# Patient Record
Sex: MALE | Race: OTHER | NOT HISPANIC OR LATINO | Employment: OTHER | URBAN - METROPOLITAN AREA
[De-identification: names, ages, dates, MRNs, and addresses within clinical notes are randomized per-mention and may not be internally consistent; named-entity substitution may affect disease eponyms.]

---

## 2017-02-03 ENCOUNTER — OFFICE VISIT (OUTPATIENT)
Dept: MEDICAL GROUP | Facility: PHYSICIAN GROUP | Age: 77
End: 2017-02-03
Payer: MEDICARE

## 2017-02-03 VITALS
TEMPERATURE: 97.9 F | HEART RATE: 82 BPM | HEIGHT: 75 IN | OXYGEN SATURATION: 99 % | RESPIRATION RATE: 14 BRPM | DIASTOLIC BLOOD PRESSURE: 86 MMHG | BODY MASS INDEX: 22.68 KG/M2 | WEIGHT: 182.4 LBS | SYSTOLIC BLOOD PRESSURE: 162 MMHG

## 2017-02-03 PROCEDURE — 4040F PNEUMOC VAC/ADMIN/RCVD: CPT | Mod: 8P | Performed by: NURSE PRACTITIONER

## 2017-02-03 PROCEDURE — G8432 DEP SCR NOT DOC, RNG: HCPCS | Performed by: NURSE PRACTITIONER

## 2017-02-03 PROCEDURE — G8484 FLU IMMUNIZE NO ADMIN: HCPCS | Performed by: NURSE PRACTITIONER

## 2017-02-03 PROCEDURE — 1036F TOBACCO NON-USER: CPT | Performed by: NURSE PRACTITIONER

## 2017-02-03 PROCEDURE — G8420 CALC BMI NORM PARAMETERS: HCPCS | Performed by: NURSE PRACTITIONER

## 2017-02-03 PROCEDURE — 1101F PT FALLS ASSESS-DOCD LE1/YR: CPT | Performed by: NURSE PRACTITIONER

## 2017-02-03 PROCEDURE — 99214 OFFICE O/P EST MOD 30 MIN: CPT | Performed by: NURSE PRACTITIONER

## 2017-02-03 RX ORDER — LISINOPRIL 20 MG/1
TABLET ORAL
Qty: 90 TAB | Refills: 0 | Status: SHIPPED | OUTPATIENT
Start: 2017-02-03 | End: 2017-03-10 | Stop reason: SDUPTHER

## 2017-02-03 RX ORDER — LISINOPRIL 20 MG/1
20 TABLET ORAL DAILY
Qty: 30 TAB | Refills: 2 | Status: SHIPPED | OUTPATIENT
Start: 2017-02-03 | End: 2017-02-03 | Stop reason: SDUPTHER

## 2017-02-03 NOTE — PROGRESS NOTES
Chief Complaint   Patient presents with   • Hypertension       Subjective:   Mariano Mina is a 76 y.o. Male patient of Dr. Kramer also if the emesis on a letter is to return if fever or sore throat and no other symptoms    Thank you very much here today for evaluation and management of:    Blood pressure elevated  Patient reports that over the last couple of weeks he has been taking his blood pressure on a daily basis and noticed that his blood pressure readings are greater than 150 systolic and over 100 diastolic. Denies chest pain shortness of breath palpitations. Has had some episodes of headaches occasionally sharp headaches. Patient is currently taking a statin for hyperlipidemia and taking at daily low-dose aspirin and Flomax for his BPH. He states that he follows a diabetic diet since his wife is a type I diabetic. States that his exercise level has fallen off in the last 3-4 months.     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  Current medicines (including changes today)  Current Outpatient Prescriptions   Medication Sig Dispense Refill   • lisinopril (PRINIVIL) 20 MG Tab Take 1 Tab by mouth every day. 30 Tab 2   • Omega-3 Fatty Acids (FISH OIL) 1000 MG Cap capsule Take 1,000 mg by mouth 3 times a day, with meals.     • tamsulosin (FLOMAX) 0.4 MG capsule TAKE ONE CAPSULE BY MOUTH AT BEDTIME 90 Cap 3   • amitriptyline (ELAVIL) 25 MG Tab Take 1 Tab by mouth at bedtime as needed for Sleep. 90 Tab 3   • simvastatin (ZOCOR) 40 MG Tab TAKE 1 TABLET BY MOUTH EVERY EVENING 90 Tab 3   • b complex vitamins tablet Take 1 Tab by mouth every day. 30 Tab 3   • Cholecalciferol (VITAMIN D3) 1000 UNIT TABS Take 2 Tabs by mouth every day. 100 Tab 3   • aspirin 81 MG tablet Take 81 mg by mouth every day.       No current facility-administered medications for this visit.     He  has a past medical history of Hyperlipidemia.    ROS as stated  "in HPI + elevated blood pressure, some headaches, - chest pain, palpitations, visual disturbances, edema  No chest pain, no shortness of breath, no abdominal pain       Objective:     Blood pressure 162/86, pulse 82, temperature 36.6 °C (97.9 °F), resp. rate 14, height 1.892 m (6' 2.5\"), weight 82.736 kg (182 lb 6.4 oz), SpO2 99 %. Body mass index is 23.11 kg/(m^2).   Physical Exam:  Constitutional: Alert, no distress.  Skin: Warm, dry, good turgor,no cyanosis, no rashes in visible areas.  Eye: Equal, round and reactive, conjunctiva clear, lids normal.  Ears: No tenderness, no discharge.  External canals are without any significant edema or erythema. Gross auditory acuity is intact.  Nose: symmetrical without tenderness, no discharge.  Mouth/Throat: lips without lesion.  Oropharynx clear.  .  Neck: Trachea midline, no masses, no obvious thyroid enlargement.. . Range of motion within normal limits.  Neuro: Cranial nerves 2-12 grossly intact.  No sensory deficit.  Respiratory: Unlabored respiratory effort, lungs clear to auscultation, no wheezes, no ronchi.  Cardiovascular: Normal S1, S2, no murmur, no edema. No carotid bruit appreciated..  Psych: Alert and oriented x3, normal affect and mood and judgement.  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        Assessment and Plan:   The following treatment plan was discussed    1. Blood pressure elevated  This is a new problem to me. Acute. Unstable. Will start patient on lisinopril 20 mg by mouth daily. Patient to continue to monitor his blood pressure morning and evening. Patient understands that if he were to experience chest pain shortness of breath visual disturbances or strokelike symptoms that this would be a 911 call. Patient will follow up in clinic in one week for blood pressure review and check. Monitor and follow.      Followup: Return in about 1 week (around 2/10/2017) for HTN.           "

## 2017-02-03 NOTE — MR AVS SNAPSHOT
"        Mariano Mina   2/3/2017 9:40 AM   Office Visit   MRN: [id number]    Department:  [facility]   Dept Phone:  [phone]    Description:  Male : 1940   Provider:  LUCY Syed           Reason for Visit     Hypertension           Allergies as of 2/3/2017     Allergen Noted Reactions    Terbinafine 2014       Loss of taste    Trazodone 2016       Excess sedation      You were diagnosed with     Blood pressure elevated   [940930]         Vital Signs     Blood Pressure Pulse Temperature Respirations Height Weight    162/86 mmHg 82 36.6 °C (97.9 °F) 14 1.892 m (6' 2.5\") 82.736 kg (182 lb 6.4 oz)    Body Mass Index Oxygen Saturation Smoking Status             23.11 kg/m2 99% Former Smoker         Basic Information     Date Of Birth Sex Race Ethnicity Preferred Language    1940 Male Other Non- English      Your appointments     Feb 10, 2017  9:40 AM   Established Patient with LUCY Syed   [facility] ([address] [id number] [phone]           You will be receiving a confirmation call a few days before your appointment from our automated call confirmation system.              Problem List              ICD-10-CM Priority Class Noted - Resolved    Mixed hyperlipidemia E78.2   2011 - Present    BPH (benign prostatic hyperplasia) N40.0   2011 - Present    Insomnia G47.00   2011 - Present    Macrocytosis without anemia D75.89   2014 - Present    Vitamin D deficiency E55.9   2014 - Present    Blood pressure elevated I10   2/3/2017 - Present      Health Maintenance        Date Due Completion Dates    IMM ZOSTER VACCINE 1999 ---    IMM PNEUMOCOCCAL 65+ (ADULT) LOW/MEDIUM RISK SERIES (1 of 2 - PCV13) 2004 ---    IMM INFLUENZA (1) 2016 11/3/2011    COLON CANCER SCREENING ANNUAL FIT 2019 (Originally 2015) 2014    IMM DTaP/Tdap/Td Vaccine (2 - Td) " 11/20/2021 11/20/2011            Current Immunizations     Influenza TIV (IM) 11/3/2011    MMR Vaccine 11/20/2011    Tdap Vaccine 11/20/2011      Below and/or attached are the medications your provider expects you to take. Review all of your home medications and newly ordered medications with your provider and/or pharmacist. Follow medication instructions as directed by your provider and/or pharmacist. Please keep your medication list with you and share with your provider. Update the information when medications are discontinued, doses are changed, or new medications (including over-the-counter products) are added; and carry medication information at all times in the event of emergency situations     Allergies:  TERBINAFINE - (reactions not documented)     TRAZODONE - (reactions not documented)               Medications  Valid as of: February 03, 2017 - 10:56 AM    Generic Name Brand Name Tablet Size Instructions for use    Amitriptyline HCl (Tab) ELAVIL 25 MG Take 1 Tab by mouth at bedtime as needed for Sleep.        Aspirin (Tab) aspirin 81 MG Take 81 mg by mouth every day.        B Complex Vitamins (Tab) b complex vitamins  Take 1 Tab by mouth every day.        Cholecalciferol (Tab) vitamin D3 (cholecalciferol) 1000 UNIT Take 2 Tabs by mouth every day.        Lisinopril (Tab) PRINIVIL 20 MG Take 1 Tab by mouth every day.        Omega-3 Fatty Acids (Cap) fish oil 1000 MG Take 1,000 mg by mouth 3 times a day, with meals.        Simvastatin (Tab) ZOCOR 40 MG TAKE 1 TABLET BY MOUTH EVERY EVENING        Tamsulosin HCl (Cap) FLOMAX 0.4 MG TAKE ONE CAPSULE BY MOUTH AT BEDTIME        .                 Medicines prescribed today were sent to:     [id number] - [place], [place] - [address] [facility] [address]    [address] [place] [address]    Phone: [phone] Fax: [phone]    Open 24 Hours?: No      Medication refill instructions:       If your prescription bottle indicates you have  medication refills left, it is not necessary to call your provider’s office. Please contact your pharmacy and they will refill your medication.    If your prescription bottle indicates you do not have any refills left, you may request refills at any time through one of the following ways: The online Klocwork system (except Urgent Care), by calling your provider’s office, or by asking your pharmacy to contact your provider’s office with a refill request. Medication refills are processed only during regular business hours and may not be available until the next business day. Your provider may request additional information or to have a follow-up visit with you prior to refilling your medication.   *Please Note: Medication refills are assigned a new Rx number when refilled electronically. Your pharmacy may indicate that no refills were authorized even though a new prescription for the same medication is available at the pharmacy. Please request the medicine by name with the pharmacy before contacting your provider for a refill.           Klocwork Access Code: [email or web address]  Expires: 3/5/2017 10:56 AM    Klocwork  A secure, online tool to manage your health information     [employer]’s Klocwork® is a secure, online tool that connects you to your personalized health information from the privacy of your home -- day or night - making it very easy for you to manage your healthcare. Once the activation process is completed, you can even access your medical information using the Klocwork noemí, which is available for free in the Apple Noemí store or Google Play store.     Klocwork provides the following levels of access (as shown below):   My Chart Features   [facility] Doctor [facility]  Specialists [facility]  Urgent  Care Non-[facility]  Primary Care  Doctor   Email your healthcare team securely and privately 24/7 X X X    Manage appointments: schedule your next appointment; view details of past/upcoming appointments X       Request prescription refills. X      View recent personal medical records, including lab and immunizations X X X X   View health record, including health history, allergies, medications X X X X   Read reports about your outpatient visits, procedures, consult and ER notes X X X X   See your discharge summary, which is a recap of your hospital and/or ER visit that includes your diagnosis, lab results, and care plan. X X       How to register for Hudl:  1. Go to  [email or web address].  2. Click on the Sign Up Now box, which takes you to the New Member Sign Up page. You will need to provide the following information:  a. Enter your Hudl Access Code exactly as it appears at the top of this page. (You will not need to use this code after you’ve completed the sign-up process. If you do not sign up before the expiration date, you must request a new code.)   b. Enter your date of birth.   c. Enter your home email address.   d. Click Submit, and follow the next screen’s instructions.  3. Create a Hudl ID. This will be your Hudl login ID and cannot be changed, so think of one that is secure and easy to remember.  4. Create a Hudl password. You can change your password at any time.  5. Enter your Password Reset Question and Answer. This can be used at a later time if you forget your password.   6. Enter your e-mail address. This allows you to receive e-mail notifications when new information is available in Hudl.  7. Click Sign Up. You can now view your health information.    For assistance activating your Hudl account, call [phone]

## 2017-02-03 NOTE — ASSESSMENT & PLAN NOTE
Patient reports that over the last couple of weeks he has been taking his blood pressure on a daily basis and noticed that his blood pressure readings are greater than 150 systolic and over 100 diastolic. Denies chest pain shortness of breath palpitations. Has had some episodes of headaches occasionally sharp headaches. Patient is currently taking a statin for hyperlipidemia and taking at daily low-dose aspirin and Flomax for his BPH. He states that he follows a diabetic diet since his wife is a type I diabetic. States that his exercise level has fallen off in the last 3-4 months.

## 2017-02-03 NOTE — TELEPHONE ENCOUNTER
Requested Prescriptions     Signed Prescriptions Disp Refills   • lisinopril (PRINIVIL) 20 MG Tab 90 Tab 0     Sig: TAKE 1 TABLET BY MOUTH EVERY DAY     Authorizing Provider: SANDER CESAR A.P.R.N.

## 2017-02-10 ENCOUNTER — OFFICE VISIT (OUTPATIENT)
Dept: MEDICAL GROUP | Facility: PHYSICIAN GROUP | Age: 77
End: 2017-02-10
Payer: MEDICARE

## 2017-02-10 VITALS
TEMPERATURE: 97.7 F | RESPIRATION RATE: 14 BRPM | BODY MASS INDEX: 23.59 KG/M2 | OXYGEN SATURATION: 98 % | HEART RATE: 94 BPM | WEIGHT: 183.8 LBS | DIASTOLIC BLOOD PRESSURE: 72 MMHG | HEIGHT: 74 IN | SYSTOLIC BLOOD PRESSURE: 122 MMHG

## 2017-02-10 PROCEDURE — 4040F PNEUMOC VAC/ADMIN/RCVD: CPT | Mod: 8P | Performed by: NURSE PRACTITIONER

## 2017-02-10 PROCEDURE — G8432 DEP SCR NOT DOC, RNG: HCPCS | Performed by: NURSE PRACTITIONER

## 2017-02-10 PROCEDURE — 1036F TOBACCO NON-USER: CPT | Performed by: NURSE PRACTITIONER

## 2017-02-10 PROCEDURE — 1101F PT FALLS ASSESS-DOCD LE1/YR: CPT | Performed by: NURSE PRACTITIONER

## 2017-02-10 PROCEDURE — G8484 FLU IMMUNIZE NO ADMIN: HCPCS | Performed by: NURSE PRACTITIONER

## 2017-02-10 PROCEDURE — 99213 OFFICE O/P EST LOW 20 MIN: CPT | Performed by: NURSE PRACTITIONER

## 2017-02-10 PROCEDURE — G8420 CALC BMI NORM PARAMETERS: HCPCS | Performed by: NURSE PRACTITIONER

## 2017-02-10 NOTE — ASSESSMENT & PLAN NOTE
Patient retruns after one week on Lisinopril 20 mg.  BP at goal in clinic at 122/72.  Denies dizziness, chest pain, palpitations.  States that he is feeling better and is ready to re-start his exercise plan.  Daily stressors with caring for wife who is diabetic and having memory loss.

## 2017-02-10 NOTE — PROGRESS NOTES
"Chief Complaint   Patient presents with   • Follow-Up     blood pressure       Subjective:   Mariano Mina is a 76 y.o. male here today for evaluation and management of:    Blood pressure elevated  Patient retruns after one week on Lisinopril 20 mg.  BP at goal in clinic at 122/72.  Denies dizziness, chest pain, palpitations.  States that he is feeling better and is ready to re-start his exercise plan.  Daily stressors with caring for wife who is diabetic and having memory loss.         Current medicines (including changes today)  Current Outpatient Prescriptions   Medication Sig Dispense Refill   • lisinopril (PRINIVIL) 20 MG Tab TAKE 1 TABLET BY MOUTH EVERY DAY 90 Tab 0   • Omega-3 Fatty Acids (FISH OIL) 1000 MG Cap capsule Take 1,000 mg by mouth 3 times a day, with meals.     • tamsulosin (FLOMAX) 0.4 MG capsule TAKE ONE CAPSULE BY MOUTH AT BEDTIME 90 Cap 3   • amitriptyline (ELAVIL) 25 MG Tab Take 1 Tab by mouth at bedtime as needed for Sleep. 90 Tab 3   • simvastatin (ZOCOR) 40 MG Tab TAKE 1 TABLET BY MOUTH EVERY EVENING 90 Tab 3   • b complex vitamins tablet Take 1 Tab by mouth every day. 30 Tab 3   • Cholecalciferol (VITAMIN D3) 1000 UNIT TABS Take 2 Tabs by mouth every day. 100 Tab 3   • aspirin 81 MG tablet Take 81 mg by mouth every day.       No current facility-administered medications for this visit.     He  has a past medical history of Hyperlipidemia.    ROS Blood pressure at goal today.  No chest pain, no shortness of breath, no abdominal pain       Objective:     Blood pressure 122/72, pulse 94, temperature 36.5 °C (97.7 °F), resp. rate 14, height 1.892 m (6' 2.49\"), weight 83.371 kg (183 lb 12.8 oz), SpO2 98 %. Body mass index is 23.29 kg/(m^2).   Physical Exam:  Constitutional: Alert, no distress.  Skin: Warm, dry, good turgor,no cyanosis, no rashes in visible areas.  Eye: Equal, round and reactive, conjunctiva clear, lids normal.  Neuro: Cranial nerves 2-12 grossly intact.  No sensory " deficit.  Respiratory: Unlabored respiratory effort, lungs clear to auscultation, no wheezes, no ronchi.  Cardiovascular: Normal S1, S2, no murmur, no edema.  Psych: Alert and oriented x3, normal affect and mood and judgement.        Assessment and Plan:   The following treatment plan was discussed    1. Blood pressure elevated  Acute. Improving.  Blood pressure at goal today.  Discussed exercise as an important foundational tool for controlling blood pressure and stressors.  Follows diabetic diet.  Discussed avoiding cold medications with sudafed.  Will recheck blood pressure in 4 weeks.  If it is still at goal, we will decrease lisinopril to 10 mg daily.  Monitor and follow.      Followup: Return in about 4 weeks (around 3/10/2017) for HTN.

## 2017-02-10 NOTE — MR AVS SNAPSHOT
"        Mariano Mina   2/10/2017 9:40 AM   Office Visit   MRN: [id number]    Department:  [facility]   Dept Phone:  [phone]    Description:  Male : 1940   Provider:  LUCY Syed           Reason for Visit     Follow-Up blood pressure      Allergies as of 2/10/2017     Allergen Noted Reactions    Terbinafine 2014       Loss of taste    Trazodone 2016       Excess sedation      You were diagnosed with     Blood pressure elevated   [093797]         Vital Signs     Blood Pressure Pulse Temperature Respirations Height Weight    122/72 mmHg 94 36.5 °C (97.7 °F) 14 1.892 m (6' 2.49\") 83.371 kg (183 lb 12.8 oz)    Body Mass Index Oxygen Saturation Smoking Status             23.29 kg/m2 98% Former Smoker         Basic Information     Date Of Birth Sex Race Ethnicity Preferred Language    1940 Male Other Non- English      Your appointments     Mar 10, 2017  9:40 AM   Established Patient with LUCY Syed   [facility] ([address] [id number] [phone]           You will be receiving a confirmation call a few days before your appointment from our automated call confirmation system.              Problem List              ICD-10-CM Priority Class Noted - Resolved    Mixed hyperlipidemia E78.2   2011 - Present    BPH (benign prostatic hyperplasia) N40.0   2011 - Present    Insomnia G47.00   2011 - Present    Macrocytosis without anemia D75.89   2014 - Present    Vitamin D deficiency E55.9   2014 - Present    Blood pressure elevated I10   2/3/2017 - Present      Health Maintenance        Date Due Completion Dates    IMM ZOSTER VACCINE 1999 ---    IMM PNEUMOCOCCAL 65+ (ADULT) LOW/MEDIUM RISK SERIES (1 of 2 - PCV13) 2004 ---    IMM INFLUENZA (1) 2016 11/3/2011    COLON CANCER SCREENING ANNUAL FIT 2019 (Originally 2015) 2014    IMM DTaP/Tdap/Td Vaccine (2 - " Td) 11/20/2021 11/20/2011            Current Immunizations     Influenza TIV (IM) 11/3/2011    MMR Vaccine 11/20/2011    Tdap Vaccine 11/20/2011      Below and/or attached are the medications your provider expects you to take. Review all of your home medications and newly ordered medications with your provider and/or pharmacist. Follow medication instructions as directed by your provider and/or pharmacist. Please keep your medication list with you and share with your provider. Update the information when medications are discontinued, doses are changed, or new medications (including over-the-counter products) are added; and carry medication information at all times in the event of emergency situations     Allergies:  TERBINAFINE - (reactions not documented)     TRAZODONE - (reactions not documented)               Medications  Valid as of: February 10, 2017 - 10:31 AM    Generic Name Brand Name Tablet Size Instructions for use    Amitriptyline HCl (Tab) ELAVIL 25 MG Take 1 Tab by mouth at bedtime as needed for Sleep.        Aspirin (Tab) aspirin 81 MG Take 81 mg by mouth every day.        B Complex Vitamins (Tab) b complex vitamins  Take 1 Tab by mouth every day.        Cholecalciferol (Tab) vitamin D3 (cholecalciferol) 1000 UNIT Take 2 Tabs by mouth every day.        Lisinopril (Tab) PRINIVIL 20 MG TAKE 1 TABLET BY MOUTH EVERY DAY        Omega-3 Fatty Acids (Cap) fish oil 1000 MG Take 1,000 mg by mouth 3 times a day, with meals.        Simvastatin (Tab) ZOCOR 40 MG TAKE 1 TABLET BY MOUTH EVERY EVENING        Tamsulosin HCl (Cap) FLOMAX 0.4 MG TAKE ONE CAPSULE BY MOUTH AT BEDTIME        .                 Medicines prescribed today were sent to:     [id number] - [place], [place] - [address] [facility] [address]    [address] [place] [address]    Phone: [phone] Fax: [phone]    Open 24 Hours?: No      Medication refill instructions:       If your prescription bottle indicates you  have medication refills left, it is not necessary to call your provider’s office. Please contact your pharmacy and they will refill your medication.    If your prescription bottle indicates you do not have any refills left, you may request refills at any time through one of the following ways: The online Cobase system (except Urgent Care), by calling your provider’s office, or by asking your pharmacy to contact your provider’s office with a refill request. Medication refills are processed only during regular business hours and may not be available until the next business day. Your provider may request additional information or to have a follow-up visit with you prior to refilling your medication.   *Please Note: Medication refills are assigned a new Rx number when refilled electronically. Your pharmacy may indicate that no refills were authorized even though a new prescription for the same medication is available at the pharmacy. Please request the medicine by name with the pharmacy before contacting your provider for a refill.           Cobase Access Code: [email or web address]  Expires: 3/5/2017 10:56 AM    Cobase  A secure, online tool to manage your health information     [employer]’s Cobase® is a secure, online tool that connects you to your personalized health information from the privacy of your home -- day or night - making it very easy for you to manage your healthcare. Once the activation process is completed, you can even access your medical information using the Cobase noemí, which is available for free in the Apple Noemí store or Google Play store.     Cobase provides the following levels of access (as shown below):   My Chart Features   [facility] Doctor [facility]  Specialists [facility]  Urgent  Care Non-[facility]  Primary Care  Doctor   Email your healthcare team securely and privately 24/7 X X X    Manage appointments: schedule your next appointment; view details of past/upcoming appointments X       Request prescription refills. X      View recent personal medical records, including lab and immunizations X X X X   View health record, including health history, allergies, medications X X X X   Read reports about your outpatient visits, procedures, consult and ER notes X X X X   See your discharge summary, which is a recap of your hospital and/or ER visit that includes your diagnosis, lab results, and care plan. X X       How to register for Qloud:  1. Go to  [email or web address].  2. Click on the Sign Up Now box, which takes you to the New Member Sign Up page. You will need to provide the following information:  a. Enter your Qloud Access Code exactly as it appears at the top of this page. (You will not need to use this code after you’ve completed the sign-up process. If you do not sign up before the expiration date, you must request a new code.)   b. Enter your date of birth.   c. Enter your home email address.   d. Click Submit, and follow the next screen’s instructions.  3. Create a Qloud ID. This will be your Qloud login ID and cannot be changed, so think of one that is secure and easy to remember.  4. Create a Qloud password. You can change your password at any time.  5. Enter your Password Reset Question and Answer. This can be used at a later time if you forget your password.   6. Enter your e-mail address. This allows you to receive e-mail notifications when new information is available in Qloud.  7. Click Sign Up. You can now view your health information.    For assistance activating your Qloud account, call [phone]

## 2017-03-10 ENCOUNTER — OFFICE VISIT (OUTPATIENT)
Dept: MEDICAL GROUP | Facility: PHYSICIAN GROUP | Age: 77
End: 2017-03-10
Payer: MEDICARE

## 2017-03-10 VITALS
DIASTOLIC BLOOD PRESSURE: 80 MMHG | HEIGHT: 75 IN | BODY MASS INDEX: 22.83 KG/M2 | HEART RATE: 75 BPM | RESPIRATION RATE: 14 BRPM | SYSTOLIC BLOOD PRESSURE: 134 MMHG | OXYGEN SATURATION: 100 % | TEMPERATURE: 97.5 F | WEIGHT: 183.6 LBS

## 2017-03-10 DIAGNOSIS — Z12.5 SCREENING FOR PROSTATE CANCER: ICD-10-CM

## 2017-03-10 DIAGNOSIS — E78.01 FAMILIAL HYPERCHOLESTEROLEMIA: ICD-10-CM

## 2017-03-10 PROCEDURE — 1101F PT FALLS ASSESS-DOCD LE1/YR: CPT | Performed by: NURSE PRACTITIONER

## 2017-03-10 PROCEDURE — 99214 OFFICE O/P EST MOD 30 MIN: CPT | Performed by: NURSE PRACTITIONER

## 2017-03-10 PROCEDURE — G8432 DEP SCR NOT DOC, RNG: HCPCS | Performed by: NURSE PRACTITIONER

## 2017-03-10 PROCEDURE — G8420 CALC BMI NORM PARAMETERS: HCPCS | Performed by: NURSE PRACTITIONER

## 2017-03-10 PROCEDURE — 4040F PNEUMOC VAC/ADMIN/RCVD: CPT | Mod: 8P | Performed by: NURSE PRACTITIONER

## 2017-03-10 PROCEDURE — 1036F TOBACCO NON-USER: CPT | Performed by: NURSE PRACTITIONER

## 2017-03-10 PROCEDURE — G8484 FLU IMMUNIZE NO ADMIN: HCPCS | Performed by: NURSE PRACTITIONER

## 2017-03-10 RX ORDER — LISINOPRIL 20 MG/1
40 TABLET ORAL
Qty: 90 TAB | Refills: 0 | Status: SHIPPED | OUTPATIENT
Start: 2017-03-10 | End: 2017-03-27 | Stop reason: SDUPTHER

## 2017-03-10 NOTE — ASSESSMENT & PLAN NOTE
Patient is here for blood pressure check. He's been taking lisinopril 20 mg tablets by mouth daily. Blood pressure at home has been running in the high 160s over 84-90 diastolic. Patient states that he does have 2 brothers that have high blood pressure. Patient denies chest pain shortness of breath palpitations or headaches at this time.

## 2017-03-10 NOTE — MR AVS SNAPSHOT
"        Mariano Mina   3/10/2017 9:40 AM   Office Visit   MRN: [id number]    Department:  [facility]   Dept Phone:  [phone]    Description:  Male : 1940   Provider:  LUCY Syed           Reason for Visit     Follow-Up BLOOD PRESSURE      Allergies as of 3/10/2017     Allergen Noted Reactions    Terbinafine 2014       Loss of taste    Trazodone 2016       Excess sedation      You were diagnosed with     Familial hypercholesterolemia   [823777]       Screening for prostate cancer   [661948]         Vital Signs     Blood Pressure Pulse Temperature Respirations Height Weight    134/80 mmHg 75 36.4 °C (97.5 °F) 14 1.892 m (6' 2.5\") 83.28 kg (183 lb 9.6 oz)    Body Mass Index Oxygen Saturation Smoking Status             23.26 kg/m2 100% Former Smoker         Basic Information     Date Of Birth Sex Race Ethnicity Preferred Language    1940 Male Other Non- English      Your appointments     May 03, 2017 10:40 AM   Established Patient with Kalyan Kramer M.D.   [facility] ([place]    [address] [place] [id number] [phone]           You will be receiving a confirmation call a few days before your appointment from our automated call confirmation system.              Problem List              ICD-10-CM Priority Class Noted - Resolved    Mixed hyperlipidemia E78.2   2011 - Present    BPH (benign prostatic hyperplasia) N40.0   2011 - Present    Insomnia G47.00   2011 - Present    Macrocytosis without anemia D75.89   2014 - Present    Vitamin D deficiency E55.9   2014 - Present    Blood pressure elevated I10   2/3/2017 - Present      Health Maintenance        Date Due Completion Dates    IMM ZOSTER VACCINE 1999 ---    IMM PNEUMOCOCCAL 65+ (ADULT) LOW/MEDIUM RISK SERIES (1 of 2 - PCV13) 2004 ---    IMM INFLUENZA (1) 2016 11/3/2011    COLON CANCER SCREENING ANNUAL FIT 2019 (Originally " 11/12/2015) 11/12/2014    IMM DTaP/Tdap/Td Vaccine (2 - Td) 11/20/2021 11/20/2011            Current Immunizations     Influenza TIV (IM) 11/3/2011    MMR Vaccine 11/20/2011    Tdap Vaccine 11/20/2011      Below and/or attached are the medications your provider expects you to take. Review all of your home medications and newly ordered medications with your provider and/or pharmacist. Follow medication instructions as directed by your provider and/or pharmacist. Please keep your medication list with you and share with your provider. Update the information when medications are discontinued, doses are changed, or new medications (including over-the-counter products) are added; and carry medication information at all times in the event of emergency situations     Allergies:  TERBINAFINE - (reactions not documented)     TRAZODONE - (reactions not documented)               Medications  Valid as of: March 10, 2017 -  9:56 AM    Generic Name Brand Name Tablet Size Instructions for use    Amitriptyline HCl (Tab) ELAVIL 25 MG Take 1 Tab by mouth at bedtime as needed for Sleep.        Aspirin (Tab) aspirin 81 MG Take 81 mg by mouth every day.        B Complex Vitamins (Tab) b complex vitamins  Take 1 Tab by mouth every day.        Cholecalciferol (Tab) vitamin D3 (cholecalciferol) 1000 UNIT Take 2 Tabs by mouth every day.        Lisinopril (Tab) PRINIVIL 20 MG Take 2 Tabs by mouth every day.        Omega-3 Fatty Acids (Cap) fish oil 1000 MG Take 1,000 mg by mouth 3 times a day, with meals.        Simvastatin (Tab) ZOCOR 40 MG TAKE 1 TABLET BY MOUTH EVERY EVENING        Tamsulosin HCl (Cap) FLOMAX 0.4 MG TAKE ONE CAPSULE BY MOUTH AT BEDTIME        .                 Medicines prescribed today were sent to:     [id number] - [place], [place] - [address] [facility] [address]    [address] [place] [address]    Phone: [phone] Fax: [phone]    Open 24 Hours?: No      Medication refill  instructions:       If your prescription bottle indicates you have medication refills left, it is not necessary to call your provider’s office. Please contact your pharmacy and they will refill your medication.    If your prescription bottle indicates you do not have any refills left, you may request refills at any time through one of the following ways: The online [facility] system (except Urgent Care), by calling your provider’s office, or by asking your pharmacy to contact your provider’s office with a refill request. Medication refills are processed only during regular business hours and may not be available until the next business day. Your provider may request additional information or to have a follow-up visit with you prior to refilling your medication.   *Please Note: Medication refills are assigned a new Rx number when refilled electronically. Your pharmacy may indicate that no refills were authorized even though a new prescription for the same medication is available at the pharmacy. Please request the medicine by name with the pharmacy before contacting your provider for a refill.        Your To Do List     Future Labs/Procedures Complete By Expires    COMP METABOLIC PANEL  As directed 3/11/2018    LIPID PROFILE  As directed 3/11/2018    PROSTATE SPECIFIC AG SCREENING  As directed 3/11/2018         [facility] Status: Patient Declined

## 2017-03-10 NOTE — PROGRESS NOTES
"Chief Complaint   Patient presents with   • Follow-Up     BLOOD PRESSURE       Subjective:   Mariano Mina is a 76 y.o. male here today for evaluation and management of:    Blood pressure elevated  Patient is here for blood pressure check. He's been taking lisinopril 20 mg tablets by mouth daily. Blood pressure at home has been running in the high 160s over 84-90 diastolic. Patient states that he does have 2 brothers that have high blood pressure. Patient denies chest pain shortness of breath palpitations or headaches at this time.         Current medicines (including changes today)  Current Outpatient Prescriptions   Medication Sig Dispense Refill   • lisinopril (PRINIVIL) 20 MG Tab Take 2 Tabs by mouth every day. 90 Tab 0   • Omega-3 Fatty Acids (FISH OIL) 1000 MG Cap capsule Take 1,000 mg by mouth 3 times a day, with meals.     • tamsulosin (FLOMAX) 0.4 MG capsule TAKE ONE CAPSULE BY MOUTH AT BEDTIME 90 Cap 3   • amitriptyline (ELAVIL) 25 MG Tab Take 1 Tab by mouth at bedtime as needed for Sleep. 90 Tab 3   • simvastatin (ZOCOR) 40 MG Tab TAKE 1 TABLET BY MOUTH EVERY EVENING 90 Tab 3   • b complex vitamins tablet Take 1 Tab by mouth every day. 30 Tab 3   • Cholecalciferol (VITAMIN D3) 1000 UNIT TABS Take 2 Tabs by mouth every day. 100 Tab 3   • aspirin 81 MG tablet Take 81 mg by mouth every day.       No current facility-administered medications for this visit.     He  has a past medical history of Hyperlipidemia.    ROS elevated blood pressure  No chest pain, no shortness of breath, no abdominal pain       Objective:     Blood pressure 134/80, pulse 75, temperature 36.4 °C (97.5 °F), resp. rate 14, height 1.892 m (6' 2.5\"), weight 83.28 kg (183 lb 9.6 oz), SpO2 100 %. Body mass index is 23.26 kg/(m^2). blood pressure at clinic today 134/80.  Physical Exam:  Constitutional: Alert, no distress.  Skin: Warm, dry, good turgor,no cyanosis, no rashes in visible areas.  Eye: Equal, round and reactive, conjunctiva " clear, lids normal.  Ears: No tenderness, no discharge.  External canals are without any significant edema or erythema. .  Gross auditory acuity is intact.  Nose: symmetrical without tenderness, no discharge.  Mouth/Throat: lips without lesion.  Oropharynx clear.    Neck: Trachea midline, no masses, no obvious thyroid enlargement... Range of motion within normal limits.  Neuro: Cranial nerves 2-12 grossly intact.  No sensory deficit.  Respiratory: Unlabored respiratory effort, lungs clear to auscultation, no wheezes, no ronchi.  Cardiovascular: Normal S1, S2, no murmur, no edema. No carotid bruit appreciated.  Psych: Alert and oriented x3, normal affect and mood and judgement.        Assessment and Plan:   The following treatment plan was discussed    1. Familial hypercholesterolemia  Chronic. Ongoing. Patient to continue taking simvastatin 40 mg Daily at bedtime. Yearly lab work is due at this time we will monitor results. Patient to return to visit with Dr. Kramer in 2 months.  - COMP METABOLIC PANEL; Future  - LIPID PROFILE; Future    2. Screening for prostate cancer  Requesting screening for prostate cancer. PSA ordered. Will monitor results.  - PROSTATE SPECIFIC AG SCREENING; Future    3. Blood pressure elevated  Acute. Ongoing. Patient continues to have elevated systolic and diastolic blood pressure. Discussed with patient lifestyle changes that he can employ, including exercise. Patient does have a treadmill. Patient unable to go to the gym anymore due to his wife's dementia. This may be a component of his increased blood pressure with caregiver stress. We will increase his lisinopril to 40 mg daily. He has a follow-up appointment with Dr. Kramer in 2 months. Patient to continue to monitor his blood pressure daily. He is to call if blood pressure continues to stay elevated. He is not interested in taking a diuretic. ER precautions given for chest pain shortness of breath visual disturbances.      Followup:  Return in about 2 months (around 5/10/2017) for HTN.

## 2017-03-10 NOTE — Clinical Note
After two months, we are not seeing bp at goal with 20 Lisinopril.  Increased to 40 mg daily.  He will see you in May for followup.  Caregiver stress is also a contributing factor, I believe.

## 2017-03-13 ENCOUNTER — HOSPITAL ENCOUNTER (OUTPATIENT)
Dept: LAB | Facility: MEDICAL CENTER | Age: 77
End: 2017-03-13
Attending: NURSE PRACTITIONER
Payer: MEDICARE

## 2017-03-13 DIAGNOSIS — Z12.5 SCREENING FOR PROSTATE CANCER: ICD-10-CM

## 2017-03-13 DIAGNOSIS — E78.01 FAMILIAL HYPERCHOLESTEROLEMIA: ICD-10-CM

## 2017-03-13 LAB
ALBUMIN SERPL BCP-MCNC: 4.1 G/DL (ref 3.2–4.9)
ALBUMIN/GLOB SERPL: 1.5 G/DL
ALP SERPL-CCNC: 34 U/L (ref 30–99)
ALT SERPL-CCNC: 15 U/L (ref 2–50)
ANION GAP SERPL CALC-SCNC: 6 MMOL/L (ref 0–11.9)
AST SERPL-CCNC: 18 U/L (ref 12–45)
BILIRUB SERPL-MCNC: 0.9 MG/DL (ref 0.1–1.5)
BUN SERPL-MCNC: 19 MG/DL (ref 8–22)
CALCIUM SERPL-MCNC: 9 MG/DL (ref 8.5–10.5)
CHLORIDE SERPL-SCNC: 107 MMOL/L (ref 96–112)
CHOLEST SERPL-MCNC: 78 MG/DL (ref 100–199)
CO2 SERPL-SCNC: 27 MMOL/L (ref 20–33)
CREAT SERPL-MCNC: 1.05 MG/DL (ref 0.5–1.4)
GLOBULIN SER CALC-MCNC: 2.8 G/DL (ref 1.9–3.5)
GLUCOSE SERPL-MCNC: 85 MG/DL (ref 65–99)
HDLC SERPL-MCNC: 42 MG/DL
LDLC SERPL CALC-MCNC: 27 MG/DL
POTASSIUM SERPL-SCNC: 3.8 MMOL/L (ref 3.6–5.5)
PROT SERPL-MCNC: 6.9 G/DL (ref 6–8.2)
PSA SERPL DL<=0.01 NG/ML-MCNC: 1.27 NG/ML (ref 0–4)
SODIUM SERPL-SCNC: 140 MMOL/L (ref 135–145)
TRIGL SERPL-MCNC: 45 MG/DL (ref 0–149)

## 2017-03-13 PROCEDURE — 36415 COLL VENOUS BLD VENIPUNCTURE: CPT

## 2017-03-13 PROCEDURE — 80061 LIPID PANEL: CPT

## 2017-03-13 PROCEDURE — 80053 COMPREHEN METABOLIC PANEL: CPT

## 2017-03-13 PROCEDURE — 84153 ASSAY OF PSA TOTAL: CPT | Mod: GA

## 2017-03-13 RX ORDER — LISINOPRIL 20 MG/1
TABLET ORAL
Refills: 0 | OUTPATIENT
Start: 2017-03-13

## 2017-03-27 ENCOUNTER — OFFICE VISIT (OUTPATIENT)
Dept: MEDICAL GROUP | Facility: PHYSICIAN GROUP | Age: 77
End: 2017-03-27
Payer: MEDICARE

## 2017-03-27 VITALS
WEIGHT: 183 LBS | TEMPERATURE: 97.8 F | DIASTOLIC BLOOD PRESSURE: 90 MMHG | BODY MASS INDEX: 22.75 KG/M2 | HEIGHT: 75 IN | OXYGEN SATURATION: 100 % | SYSTOLIC BLOOD PRESSURE: 138 MMHG | HEART RATE: 67 BPM

## 2017-03-27 DIAGNOSIS — E78.2 MIXED HYPERLIPIDEMIA: ICD-10-CM

## 2017-03-27 PROCEDURE — 99214 OFFICE O/P EST MOD 30 MIN: CPT | Performed by: INTERNAL MEDICINE

## 2017-03-27 RX ORDER — AMLODIPINE BESYLATE 5 MG/1
5 TABLET ORAL DAILY
Qty: 90 TAB | Refills: 1 | Status: SHIPPED | OUTPATIENT
Start: 2017-03-27 | End: 2017-05-03 | Stop reason: SDUPTHER

## 2017-03-27 RX ORDER — LISINOPRIL 20 MG/1
20 TABLET ORAL 2 TIMES DAILY
Qty: 180 TAB | Refills: 3
Start: 2017-03-27 | End: 2017-05-03 | Stop reason: SDUPTHER

## 2017-03-27 NOTE — ASSESSMENT & PLAN NOTE
Pt is on simvastatin 40 mg daily. Patient is taking medication as prescribed    Patient denies myalgias. Per chart review there is no elevated liver enzymes recently

## 2017-03-27 NOTE — PROGRESS NOTES
"Subjective:   Mariano Mina is a 76 y.o. male here today for hypertension, dyslipidemia    Blood pressure elevated  Pt in on lisinopril 60 mg daily for HTN. Pt reports compliance with medication. BP is at goal per JNC 8 critieria today in clinic today but pt reports that at home he has had blood pressures in the 120-150's range with occasional readings in the 160's systolic. Diastolic readings have been in the 's range.     Denies chest pain, shortness of breath, headache.     He is concerned about heart disease. He reports a previous echo that showed \"thickening\" and mild disease in 1 vessel on an angiogram.    Mixed hyperlipidemia  Pt is on simvastatin 40 mg daily. Patient is taking medication as prescribed    Patient denies myalgias. Per chart review there is no elevated liver enzymes recently         Current medicines (including changes today)  Current Outpatient Prescriptions   Medication Sig Dispense Refill   • lisinopril (PRINIVIL) 20 MG Tab Take 1 Tab by mouth 2 times a day. 180 Tab 3   • amlodipine (NORVASC) 5 MG Tab Take 1 Tab by mouth every day. 90 Tab 1   • Omega-3 Fatty Acids (FISH OIL) 1000 MG Cap capsule Take 1,000 mg by mouth 3 times a day, with meals.     • tamsulosin (FLOMAX) 0.4 MG capsule TAKE ONE CAPSULE BY MOUTH AT BEDTIME 90 Cap 3   • amitriptyline (ELAVIL) 25 MG Tab Take 1 Tab by mouth at bedtime as needed for Sleep. 90 Tab 3   • b complex vitamins tablet Take 1 Tab by mouth every day. 30 Tab 3   • Cholecalciferol (VITAMIN D3) 1000 UNIT TABS Take 2 Tabs by mouth every day. 100 Tab 3   • aspirin 81 MG tablet Take 81 mg by mouth every day.     • simvastatin (ZOCOR) 40 MG Tab TAKE 1 TABLET BY MOUTH EVERY EVENING 90 Tab 3     No current facility-administered medications for this visit.     He  has a past medical history of Hyperlipidemia.    ROS   No chest pain, no shortness of breath, no headache       Objective:     Blood pressure 138/90, pulse 67, temperature 36.6 °C (97.8 °F), height " "1.892 m (6' 2.5\"), weight 83.008 kg (183 lb), SpO2 100 %. Body mass index is 23.19 kg/(m^2).   Physical Exam:  Constitutional: Alert & oriented, no acute distress  Eye: Conjunctiva clear, lids normal, no discharge  ENMT: Lips without lesions, normal external nose and ears  Neck: Trachea midline, no masses, no thyromegaly. No cervical or supraclavicular lymphadenopathy  Respiratory: Unlabored respiratory effort, lungs clear to auscultation, no wheezes, no ronchi  Cardiovascular: Normal S1, S2, no murmur, no edema  Skin: Warm, dry, good turgor, no rashes in visible areas  Neuro: No overt focal neurologic deficits, normal gait  Psych: Normal mood and affect      Assessment and Plan:   The following treatment plan was discussed    1. Blood pressure elevated  Blood pressure readings at home show many diastolic blood pressure readings that are suboptimal and occasional systolic readings are also suboptimal. Patient counseled to decrease lisinopril to 20 mg twice a day as evidence for blood pressure improvement at levels above this are not consistent. We'll add amlodipine 5 mg daily to regimen. Patient has appointment in 4-5 weeks already scheduled with PCP at which point this can be further evaluated. Patient counseled to continue taking blood pressure readings and bring in blood pressure log at follow-up appointment  - lisinopril (PRINIVIL) 20 MG Tab; Take 1 Tab by mouth 2 times a day.  Dispense: 180 Tab; Refill: 3  - amlodipine (NORVASC) 5 MG Tab; Take 1 Tab by mouth every day.  Dispense: 90 Tab; Refill: 1  - ECHOCARDIOGRAM COMP W/O CONT; Future    2. Mixed hyperlipidemia  Continue taking simvastatin      Followup: Return for as scheduled with PCP.    Please note that this dictation was created using voice recognition software. I have made every reasonable attempt to correct obvious errors, but I expect that there are errors of grammar and possibly content that I did not discover before finalizing the note.            "

## 2017-03-27 NOTE — ASSESSMENT & PLAN NOTE
"Pt in on lisinopril 60 mg daily for HTN. Pt reports compliance with medication. BP is at goal per [facility] 8 critieria today in clinic today but pt reports that at home he has had blood pressures in the 120-150's range with occasional readings in the 160's systolic. Diastolic readings have been in the 's range.     Denies chest pain, shortness of breath, headache.     He is concerned about heart disease. He reports a previous echo that showed \"thickening\" and mild disease in 1 vessel on an angiogram.  "

## 2017-03-29 RX ORDER — SIMVASTATIN 40 MG
TABLET ORAL
Qty: 90 TAB | Refills: 3 | Status: SHIPPED | OUTPATIENT
Start: 2017-03-29 | End: 2018-04-10 | Stop reason: SDUPTHER

## 2017-04-10 RX ORDER — AMITRIPTYLINE HYDROCHLORIDE 25 MG/1
TABLET, FILM COATED ORAL
Qty: 90 TAB | Refills: 1 | Status: SHIPPED | OUTPATIENT
Start: 2017-04-10 | End: 2017-10-03 | Stop reason: SDUPTHER

## 2017-04-25 ENCOUNTER — APPOINTMENT (OUTPATIENT)
Dept: CARDIOLOGY | Facility: MEDICAL CENTER | Age: 77
End: 2017-04-25
Attending: INTERNAL MEDICINE
Payer: MEDICARE

## 2017-05-03 ENCOUNTER — OFFICE VISIT (OUTPATIENT)
Dept: MEDICAL GROUP | Facility: PHYSICIAN GROUP | Age: 77
End: 2017-05-03
Payer: MEDICARE

## 2017-05-03 VITALS
WEIGHT: 195 LBS | BODY MASS INDEX: 24.25 KG/M2 | RESPIRATION RATE: 14 BRPM | SYSTOLIC BLOOD PRESSURE: 110 MMHG | TEMPERATURE: 97.3 F | HEIGHT: 75 IN | OXYGEN SATURATION: 96 % | DIASTOLIC BLOOD PRESSURE: 62 MMHG | HEART RATE: 64 BPM

## 2017-05-03 DIAGNOSIS — E78.2 MIXED HYPERLIPIDEMIA: ICD-10-CM

## 2017-05-03 DIAGNOSIS — R19.8 RLQ FULLNESS: ICD-10-CM

## 2017-05-03 DIAGNOSIS — I10 ESSENTIAL HYPERTENSION: ICD-10-CM

## 2017-05-03 PROCEDURE — G8432 DEP SCR NOT DOC, RNG: HCPCS | Performed by: FAMILY MEDICINE

## 2017-05-03 PROCEDURE — 99214 OFFICE O/P EST MOD 30 MIN: CPT | Performed by: FAMILY MEDICINE

## 2017-05-03 PROCEDURE — 1101F PT FALLS ASSESS-DOCD LE1/YR: CPT | Performed by: FAMILY MEDICINE

## 2017-05-03 PROCEDURE — 1036F TOBACCO NON-USER: CPT | Performed by: FAMILY MEDICINE

## 2017-05-03 PROCEDURE — G8420 CALC BMI NORM PARAMETERS: HCPCS | Performed by: FAMILY MEDICINE

## 2017-05-03 PROCEDURE — 4040F PNEUMOC VAC/ADMIN/RCVD: CPT | Mod: 8P | Performed by: FAMILY MEDICINE

## 2017-05-03 RX ORDER — LISINOPRIL 20 MG/1
20 TABLET ORAL 2 TIMES DAILY
Qty: 180 TAB | Refills: 3 | Status: SHIPPED | OUTPATIENT
Start: 2017-05-03 | End: 2018-05-05 | Stop reason: SDUPTHER

## 2017-05-03 RX ORDER — AMLODIPINE BESYLATE 5 MG/1
5 TABLET ORAL DAILY
Qty: 90 TAB | Refills: 3 | Status: SHIPPED | OUTPATIENT
Start: 2017-05-03

## 2017-05-03 NOTE — MR AVS SNAPSHOT
"        Mariano Mina   5/3/2017 10:40 AM   Office Visit   MRN: [id number]    Department:  [facility]   Dept Phone:  [phone]    Description:  Male : 1940   Provider:  Kalyan Kramer M.D.           Reason for Visit     Follow-Up           Allergies as of 5/3/2017     Allergen Noted Reactions    Terbinafine 2014       Loss of taste    Trazodone 2016       Excess sedation      You were diagnosed with     Essential hypertension   [2051718]       RLQ fullness   [334130]         Vital Signs     Blood Pressure Pulse Temperature Respirations Height Weight    110/62 mmHg 64 36.3 °C (97.3 °F) 14 1.892 m (6' 2.5\") 88.451 kg (195 lb)    Body Mass Index Oxygen Saturation Smoking Status             24.71 kg/m2 96% Former Smoker         Basic Information     Date Of Birth Sex Race Ethnicity Preferred Language    1940 Male Other Non- English      Your appointments     May 10, 2017  9:15 AM   ECHO with ECHO [facility], [facility] EXAM 10   ECHOCARDIOLOGY [facility])    [address] [facility] [address]   [phone]           No prep            Aug 09, 2017 10:00 AM   Established Patient with Kalyan Kramer M.D.   [address] [id number] [phone]           You will be receiving a confirmation call a few days before your appointment from our automated call confirmation system.              Problem List              ICD-10-CM Priority Class Noted - Resolved    Mixed hyperlipidemia E78.2   2011 - Present    BPH (benign prostatic hyperplasia) N40.0   2011 - Present    Insomnia G47.00   2011 - Present    Macrocytosis without anemia D75.89   2014 - Present    Vitamin D deficiency E55.9   2014 - Present    Essential hypertension I10   5/3/2017 - Present      Health Maintenance        Date Due Completion Dates    IMM ZOSTER VACCINE 1999 ---    IMM PNEUMOCOCCAL 65+ (ADULT) LOW/MEDIUM RISK SERIES (1 of " 2 - PCV13) 12/8/2004 ---    COLON CANCER SCREENING ANNUAL FIT 12/30/2019 (Originally 11/12/2015) 11/12/2014    IMM DTaP/Tdap/Td Vaccine (2 - Td) 11/20/2021 11/20/2011            Current Immunizations     Influenza TIV (IM) 11/3/2011    MMR Vaccine 11/20/2011    Tdap Vaccine 11/20/2011      Below and/or attached are the medications your provider expects you to take. Review all of your home medications and newly ordered medications with your provider and/or pharmacist. Follow medication instructions as directed by your provider and/or pharmacist. Please keep your medication list with you and share with your provider. Update the information when medications are discontinued, doses are changed, or new medications (including over-the-counter products) are added; and carry medication information at all times in the event of emergency situations     Allergies:  TERBINAFINE - (reactions not documented)     TRAZODONE - (reactions not documented)               Medications  Valid as of: May 03, 2017 - 11:15 AM    Generic Name Brand Name Tablet Size Instructions for use    Amitriptyline HCl (Tab) ELAVIL 25 MG TAKE 1 TABLET BY MOUTH AT BEDTIME AS NEEDED FOR SLEEP        AmLODIPine Besylate (Tab) NORVASC 5 MG Take 1 Tab by mouth every day.        Aspirin (Tab) aspirin 81 MG Take 81 mg by mouth every day.        B Complex Vitamins (Tab) b complex vitamins  Take 1 Tab by mouth every day.        Cholecalciferol (Tab) vitamin D3 (cholecalciferol) 1000 UNIT Take 2 Tabs by mouth every day.        Lisinopril (Tab) PRINIVIL 20 MG Take 1 Tab by mouth 2 times a day.        Omega-3 Fatty Acids (Cap) fish oil 1000 MG Take 1,000 mg by mouth 3 times a day, with meals.        Simvastatin (Tab) ZOCOR 40 MG TAKE 1 TABLET BY MOUTH EVERY EVENING        Tamsulosin HCl (Cap) FLOMAX 0.4 MG TAKE ONE CAPSULE BY MOUTH AT BEDTIME        .                 Medicines prescribed today were sent to:     [facility] - [address] [facility]  [address] [facility] [address]    [address] [place] [address]    Phone: [phone] Fax: [phone]    Open 24 Hours?: No      Medication refill instructions:       If your prescription bottle indicates you have medication refills left, it is not necessary to call your provider’s office. Please contact your pharmacy and they will refill your medication.    If your prescription bottle indicates you do not have any refills left, you may request refills at any time through one of the following ways: The online [facility] system (except Urgent Care), by calling your provider’s office, or by asking your pharmacy to contact your provider’s office with a refill request. Medication refills are processed only during regular business hours and may not be available until the next business day. Your provider may request additional information or to have a follow-up visit with you prior to refilling your medication.   *Please Note: Medication refills are assigned a new Rx number when refilled electronically. Your pharmacy may indicate that no refills were authorized even though a new prescription for the same medication is available at the pharmacy. Please request the medicine by name with the pharmacy before contacting your provider for a refill.        Your To Do List     Future Labs/Procedures Complete By Expires    US-ABDOMEN COMPLETE SURVEY  As directed 11/3/2017         [facility] Status: Patient Declined

## 2017-05-03 NOTE — PROGRESS NOTES
Patient comes in for follow-up of his blood pressure. He has seen both Zohra RIVAS as well as Dr. Austin. He is now on lisinopril 20 mg by mouth twice a day and amlodipine 5 mg by mouth daily. He is doing well. He brings in blood pressure readings which are now in the 1:30 to 135/70-80 range. He has no chest pains or shortness of breath.  Most importantly,  he says he feels well.    I reviewed the following    Past Medical History   Diagnosis Date   • Hyperlipidemia         Past Surgical History   Procedure Laterality Date   • Femur orif  1964     right side   • Finger amputation  1964     RSF tip   • Turp-vapor  2002     BPH done in [place]       Allergies   Allergen Reactions   • Terbinafine      Loss of taste   • Trazodone      Excess sedation       Current Outpatient Prescriptions   Medication Sig Dispense Refill   • amlodipine (NORVASC) 5 MG Tab Take 1 Tab by mouth every day. 90 Tab 3   • lisinopril (PRINIVIL) 20 MG Tab Take 1 Tab by mouth 2 times a day. 180 Tab 3   • amitriptyline (ELAVIL) 25 MG Tab TAKE 1 TABLET BY MOUTH AT BEDTIME AS NEEDED FOR SLEEP 90 Tab 1   • simvastatin (ZOCOR) 40 MG Tab TAKE 1 TABLET BY MOUTH EVERY EVENING 90 Tab 3   • Omega-3 Fatty Acids (FISH OIL) 1000 MG Cap capsule Take 1,000 mg by mouth 3 times a day, with meals.     • tamsulosin (FLOMAX) 0.4 MG capsule TAKE ONE CAPSULE BY MOUTH AT BEDTIME 90 Cap 3   • b complex vitamins tablet Take 1 Tab by mouth every day. 30 Tab 3   • Cholecalciferol (VITAMIN D3) 1000 UNIT TABS Take 2 Tabs by mouth every day. 100 Tab 3   • aspirin 81 MG tablet Take 81 mg by mouth every day.       No current facility-administered medications for this visit.        Family History   Problem Relation Age of Onset   • Cancer Brother      Prostate cancer   • Dementia Father    • Cancer Brother      prostate       Social History     Social History   • Marital Status:      Spouse Name: N/A   • Number of Children: N/A   • Years of Education: N/A      Occupational History   • Not on file.     Social History Main Topics   • Smoking status: Former Smoker -- 0.25 packs/day for 8 years     Types: Cigarettes     Quit date: 01/01/1965   • Smokeless tobacco: Never Used   • Alcohol Use: 0.5 oz/week     1 Standard drinks or equivalent per week      Comment: rarely   • Drug Use: No   • Sexual Activity:     Partners: Female     Birth Control/ Protection: Post-Menopausal     Other Topics Concern   • Not on file     Social History Narrative          Physical Exam   Constitutional: He is oriented. He appears well-developed and well-nourished. No distress.   HENT:   Head: Normocephalic and atraumatic.   Right Ear: External ear normal. Ear canal and TM normal   Left Ear: External ear normal. Ear canal and TM normal   Nose: Nose normal.   Mouth/Throat: Oropharynx is clear and moist.   Eyes: Conjunctivae and extraocular motions are normal. Pupils are equal, round, and reactive to light.        Fundi benign bilaterally   Neck: No thyromegaly present.   Cardiovascular: Normal rate, regular rhythm, normal heart sounds and intact distal pulses.  Exam reveals no gallop.    No murmur heard.  Pulmonary/Chest: Effort normal and breath sounds normal. No respiratory distress. He has no wheezes. He has no rales.   Abdominal: He has a sensation of fullness in the right lower quadrant of his abdomen. I'm not sure what this means. I have not felt this on this patient before. Soft. Bowel sounds are normal. No hepatosplenomegaly. He exhibits no distension. No tenderness. He has no rebound and no guarding.   Musculoskeletal: Normal range of motion. He exhibits no edema and no tenderness.   Lymphadenopathy:     He has no cervical adenopathy.  No supraclavicular adenopathy.   Neurological: He is alert and oriented. He has normal reflexes.        Babinskis downgoing bilaterally   Skin: Skin is warm and dry. No rash noted. No erythema.   Psychiatric: He has a normal mood and appropriate affect.  His behavior is normal. Judgment and thought content normal.     1. Essential hypertension --well-controlled  amlodipine (NORVASC) 5 MG Tab    lisinopril (PRINIVIL) 20 MG Tab   2. RLQ fullness  US-ABDOMEN COMPLETE SURVEY   3. Mixed hyperlipidemia   doing well      Recheck 3 months or when necessary    Please note that this dictation was created using voice recognition software. I have worked with consultants from the vendor as well as technical experts from [facility] [employer] to optimize the interface. I have made every reasonable attempt to correct obvious errors, but I expect that there are errors of grammar and possibly content that I did not discover before finalizing the note.

## 2017-05-03 NOTE — PATIENT INSTRUCTIONS
Patient given written instructions regarding labs, imaging, medications, referrals, dietary and lifestyle management, and return visit.    Kalyan Kramer MD

## 2017-05-08 ENCOUNTER — TELEPHONE (OUTPATIENT)
Dept: MEDICAL GROUP | Facility: PHYSICIAN GROUP | Age: 77
End: 2017-05-08

## 2017-05-08 ENCOUNTER — HOSPITAL ENCOUNTER (OUTPATIENT)
Dept: RADIOLOGY | Facility: MEDICAL CENTER | Age: 77
End: 2017-05-08
Attending: FAMILY MEDICINE
Payer: MEDICARE

## 2017-05-08 DIAGNOSIS — R19.8 RLQ FULLNESS: ICD-10-CM

## 2017-05-08 PROCEDURE — 76700 US EXAM ABDOM COMPLETE: CPT

## 2017-05-08 NOTE — TELEPHONE ENCOUNTER
Radiologist called with an urgent result stating that Mariano bladder is huge with over 2 liter of fluid. Possibly obstructed couldn't get actual size it was so big.

## 2017-05-08 NOTE — TELEPHONE ENCOUNTER
Spoke with Mariano very hesitant on going to the ER but stated he is going. Concerned about his wife due to her memory problem told him she can go with him. I explained he is holding a lot of urine in his bladder and it needs to be relieved. Mariano stated he has a prostate problem I told him they may do more testing to find out why he is retaining urine after he urinate he states he hasn't gone today

## 2017-05-08 NOTE — TELEPHONE ENCOUNTER
----- Message from Kalyan Kramer M.D. sent at 5/8/2017  2:56 PM PDT -----  Dear Mariano    Your Abdominal Ultrasound shows a VERY large bladder--please go to the Emergency Room about this because you may well have a blockage of urinary drainage !!    Kalyan Kramer M.D.

## 2017-05-09 ENCOUNTER — TELEPHONE (OUTPATIENT)
Dept: MEDICAL GROUP | Facility: PHYSICIAN GROUP | Age: 77
End: 2017-05-09

## 2017-05-09 NOTE — TELEPHONE ENCOUNTER
PT RETURNED YOUR CALL STATES HE WENT TO [facility] ER. HE HAS AN APPT WITH UROLOGY ON 6/6/17. IS HOME NOW WITH A CATHETER AND BAG.

## 2017-05-09 NOTE — TELEPHONE ENCOUNTER
I called the patient. He did not go to the emergency room yesterday, because he said he had too many family issues to manage. He is going to go to the [facility] emergency room this morning. I'm glad he is going to get this large bladder investigated further. He says he had a problem similar to this in the past. I told him I'm worried about his kidneys, and his urinary system. He is aware of this and he will go into the [facility] emergency room today, he says. He thanked me for the call.Kalyan Kramer MD

## 2017-05-28 ENCOUNTER — OFFICE VISIT (OUTPATIENT)
Dept: URGENT CARE | Facility: PHYSICIAN GROUP | Age: 77
End: 2017-05-28
Payer: MEDICARE

## 2017-05-28 VITALS
BODY MASS INDEX: 24.25 KG/M2 | SYSTOLIC BLOOD PRESSURE: 128 MMHG | RESPIRATION RATE: 12 BRPM | OXYGEN SATURATION: 97 % | WEIGHT: 195 LBS | HEIGHT: 75 IN | HEART RATE: 74 BPM | TEMPERATURE: 98.2 F | DIASTOLIC BLOOD PRESSURE: 76 MMHG

## 2017-05-28 DIAGNOSIS — N30.00 ACUTE CYSTITIS WITHOUT HEMATURIA: ICD-10-CM

## 2017-05-28 PROCEDURE — 99213 OFFICE O/P EST LOW 20 MIN: CPT | Performed by: FAMILY MEDICINE

## 2017-05-28 PROCEDURE — 1101F PT FALLS ASSESS-DOCD LE1/YR: CPT | Performed by: FAMILY MEDICINE

## 2017-05-28 PROCEDURE — 1036F TOBACCO NON-USER: CPT | Performed by: FAMILY MEDICINE

## 2017-05-28 PROCEDURE — 4040F PNEUMOC VAC/ADMIN/RCVD: CPT | Mod: 8P | Performed by: FAMILY MEDICINE

## 2017-05-28 PROCEDURE — G8420 CALC BMI NORM PARAMETERS: HCPCS | Performed by: FAMILY MEDICINE

## 2017-05-28 PROCEDURE — G8432 DEP SCR NOT DOC, RNG: HCPCS | Performed by: FAMILY MEDICINE

## 2017-05-28 RX ORDER — CIPROFLOXACIN 500 MG/1
500 TABLET, FILM COATED ORAL 2 TIMES DAILY
Qty: 14 TAB | Refills: 0 | Status: SHIPPED | OUTPATIENT
Start: 2017-05-28 | End: 2017-05-30 | Stop reason: SDUPTHER

## 2017-05-28 NOTE — PROGRESS NOTES
Subjective:      CC:  presents with Dysuria            Dysuria   This is a new problem. The current episode started in the past 2-3 weeks.   He was recently admitted for urinary retension and had cardenas placed and now has indwelling cardenas.    He felt improved on the macrobid, but ran out after a week.   He has appt to see urology soon.         Pertinent negatives include no chills, discharge, flank pain, nausea or vomiting.      Social History     Social History   • Marital Status:      Spouse Name: N/A   • Number of Children: N/A   • Years of Education: N/A     Occupational History   • Not on file.     Social History Main Topics   • Smoking status: Former Smoker -- 0.25 packs/day for 8 years     Types: Cigarettes     Quit date: 01/01/1965   • Smokeless tobacco: Never Used   • Alcohol Use: 0.5 oz/week     1 Standard drinks or equivalent per week      Comment: rarely   • Drug Use: No   • Sexual Activity:     Partners: Female     Birth Control/ Protection: Post-Menopausal     Other Topics Concern   • Not on file     Social History Narrative         Family History   Problem Relation Age of Onset   • Cancer Brother      Prostate cancer   • Dementia Father    • Cancer Brother      prostate         Allergies   Allergen Reactions   • Terbinafine      Loss of taste   • Trazodone      Excess sedation           Current Outpatient Prescriptions on File Prior to Visit   Medication Sig Dispense Refill   • amlodipine (NORVASC) 5 MG Tab Take 1 Tab by mouth every day. 90 Tab 3   • lisinopril (PRINIVIL) 20 MG Tab Take 1 Tab by mouth 2 times a day. 180 Tab 3   • amitriptyline (ELAVIL) 25 MG Tab TAKE 1 TABLET BY MOUTH AT BEDTIME AS NEEDED FOR SLEEP 90 Tab 1   • simvastatin (ZOCOR) 40 MG Tab TAKE 1 TABLET BY MOUTH EVERY EVENING 90 Tab 3   • Omega-3 Fatty Acids (FISH OIL) 1000 MG Cap capsule Take 1,000 mg by mouth 3 times a day, with meals.     • tamsulosin (FLOMAX) 0.4 MG capsule TAKE ONE CAPSULE BY MOUTH AT BEDTIME 90 Cap 3   •  "b complex vitamins tablet Take 1 Tab by mouth every day. 30 Tab 3   • Cholecalciferol (VITAMIN D3) 1000 UNIT TABS Take 2 Tabs by mouth every day. 100 Tab 3   • aspirin 81 MG tablet Take 81 mg by mouth every day.       No current facility-administered medications on file prior to visit.       Review of Systems   Constitutional: Negative for chills.   Respiratory: Negative for shortness of breath.    Cardiovascular: Negative for chest pain.   Gastrointestinal: Negative for nausea, vomiting and abdominal pain.   Genitourinary: Positive for dysuria, urgency and frequency. Negative for flank pain.   Skin: Negative for rash.   Neurological: Negative for dizziness and headaches.   All other systems reviewed and are negative.         Objective:      Blood pressure 128/76, pulse 74, temperature 36.8 °C (98.2 °F), resp. rate 12, height 1.892 m (6' 2.5\"), weight 88.451 kg (195 lb), SpO2 97 %.      Physical Exam   Constitutional: pt is oriented to person, place, and time. Pt appears well-developed and well-nourished. No distress.   HENT:   Head: Normocephalic and atraumatic.   Mouth/Throat: Mucous membranes are normal.   Eyes: Conjunctivae and EOM are normal. Pupils are equal, round, and reactive to light. Right eye exhibits no discharge. Left eye exhibits no discharge. No scleral icterus.   Neck: Normal range of motion. Neck supple.   Cardiovascular: Normal rate, regular rhythm, normal heart sounds and intact distal pulses.    No murmur heard.  Pulmonary/Chest: Effort normal and breath sounds normal. No respiratory distress. Pt has no wheezes,  rales.   Abdominal: Bowel sounds are normal. Pt exhibits no distension and no mass. There is no tenderness. There is no rebound, no guarding and no CVA tenderness.   Neurological: pt is alert and oriented to person, place, and time.   Skin: Skin is warm and dry.   Psychiatric: behavior is normal.   Nursing note and vitals reviewed.             Assessment/Plan:     1. UTI with urinary " retension \    Already has cardenas in place.     Will switch to cipro for better coverage    F/u urology      - ciprofloxacin (CIPRO) 500 MG Tab; Take 1 Tab by mouth 2 times a day for 7 days.  Dispense: 14 Tab; Refill: 0

## 2017-05-30 DIAGNOSIS — N30.00 ACUTE CYSTITIS WITHOUT HEMATURIA: ICD-10-CM

## 2017-05-30 RX ORDER — CIPROFLOXACIN 500 MG/1
500 TABLET, FILM COATED ORAL 2 TIMES DAILY
Qty: 6 TAB | Refills: 0 | Status: SHIPPED | OUTPATIENT
Start: 2017-05-30 | End: 2017-06-28 | Stop reason: SDUPTHER

## 2017-06-28 RX ORDER — CIPROFLOXACIN 500 MG/1
TABLET, FILM COATED ORAL
Qty: 10 TAB | Refills: 0 | Status: SHIPPED | OUTPATIENT
Start: 2017-06-28 | End: 2017-07-24 | Stop reason: SDUPTHER

## 2017-07-17 RX ORDER — TAMSULOSIN HYDROCHLORIDE 0.4 MG/1
CAPSULE ORAL
Qty: 90 CAP | Refills: 3 | Status: SHIPPED | OUTPATIENT
Start: 2017-07-17

## 2017-07-17 NOTE — TELEPHONE ENCOUNTER
Was the patient seen in the last year in this department? Yes  5/03/17    Does patient have an active prescription for medications requested? No     Received Request Via: Pharmacy

## 2017-07-24 DIAGNOSIS — N30.00 ACUTE CYSTITIS WITHOUT HEMATURIA: ICD-10-CM

## 2017-07-24 RX ORDER — CIPROFLOXACIN 500 MG/1
500 TABLET, FILM COATED ORAL 2 TIMES DAILY
Qty: 14 TAB | Refills: 0 | Status: SHIPPED | OUTPATIENT
Start: 2017-07-24 | End: 2017-08-09 | Stop reason: SDUPTHER

## 2017-07-24 RX ORDER — CIPROFLOXACIN 500 MG/1
TABLET, FILM COATED ORAL
Refills: 0 | OUTPATIENT
Start: 2017-07-24

## 2017-07-24 NOTE — TELEPHONE ENCOUNTER
Pt would like a refill on the Cipro for his UTI sx. He is still on a catheter and does not feel he needs to come in to the office for every UTI until this is worked out in Sept. Please advise.       Pt had the pharmacy request on Friday was denied by Luzmaria.

## 2017-08-08 ENCOUNTER — TELEPHONE (OUTPATIENT)
Dept: MEDICAL GROUP | Facility: PHYSICIAN GROUP | Age: 77
End: 2017-08-08

## 2017-08-08 NOTE — TELEPHONE ENCOUNTER
ESTABLISHED PATIENT PRE-VISIT PLANNING     Note: Patient will not be contacted if there is no indication to call.     1.  Reviewed notes from the last few office visits within the medical group: Yes    2.  If any orders were placed at last visit or intended to be done for this visit (i.e. 6 mos follow-up), do we have Results/Consult Notes?        •  Labs - Labs ordered, completed and results are in chart.       •  Imaging - Imaging ordered, completed and results are in chart.       •  Referrals - No referrals were ordered at last office visit.    3. Is this appointment scheduled as a Hospital Follow-Up? No    4.  Immunizations were updated in Epic using WebIZ?: Epic matches WebIZ       •  Web Iz Recommendations: PREVNAR (PCV13)  and ZOSTAVAX (Shingles)    5.  Patient is due for the following Health Maintenance Topics:   Health Maintenance Due   Topic Date Due   • IMM ZOSTER VACCINE  12/08/1999   • IMM PNEUMOCOCCAL 65+ (ADULT) LOW/MEDIUM RISK SERIES (1 of 2 - PCV13) 12/08/2004           6.  Patient was informed to arrive 15 min prior to their scheduled appointment and bring in their medication bottles.

## 2017-08-09 ENCOUNTER — OFFICE VISIT (OUTPATIENT)
Dept: MEDICAL GROUP | Facility: PHYSICIAN GROUP | Age: 77
End: 2017-08-09
Payer: MEDICARE

## 2017-08-09 VITALS
TEMPERATURE: 95.2 F | HEART RATE: 85 BPM | HEIGHT: 75 IN | RESPIRATION RATE: 14 BRPM | DIASTOLIC BLOOD PRESSURE: 68 MMHG | BODY MASS INDEX: 22.01 KG/M2 | WEIGHT: 177 LBS | SYSTOLIC BLOOD PRESSURE: 124 MMHG | OXYGEN SATURATION: 97 %

## 2017-08-09 DIAGNOSIS — D75.89 MACROCYTOSIS WITHOUT ANEMIA: ICD-10-CM

## 2017-08-09 DIAGNOSIS — R33.8 URINARY RETENTION DUE TO BENIGN PROSTATIC HYPERPLASIA: ICD-10-CM

## 2017-08-09 DIAGNOSIS — N40.1 BENIGN NODULAR PROSTATIC HYPERPLASIA WITH LOWER URINARY TRACT SYMPTOMS: ICD-10-CM

## 2017-08-09 DIAGNOSIS — N40.1 URINARY RETENTION DUE TO BENIGN PROSTATIC HYPERPLASIA: ICD-10-CM

## 2017-08-09 DIAGNOSIS — I10 ESSENTIAL HYPERTENSION: ICD-10-CM

## 2017-08-09 PROCEDURE — 99214 OFFICE O/P EST MOD 30 MIN: CPT | Performed by: FAMILY MEDICINE

## 2017-08-09 RX ORDER — CIPROFLOXACIN 500 MG/1
500 TABLET, FILM COATED ORAL 2 TIMES DAILY
Qty: 20 TAB | Refills: 1 | Status: SHIPPED | OUTPATIENT
Start: 2017-08-09 | End: 2017-08-19

## 2017-08-09 NOTE — PROGRESS NOTES
Patient comes in to recheck on his urinary retention which is thought to be due to a very enlarged prostate which then causes bladder to become quite large. He is going to be having a TURP with Dr. Tolliver on September 13, 2017. Dr. Tolliver prefers that the patient have a general anesthetic. The patient asked me about this. He had a TURP in [place] many years ago and it was done with a spinal and he did well with that.  The patient is feeling well. He catheterizes himself 3-4 times a day now and is very careful about sterile technique. Nonetheless, he has had some urine infections. He asked for a refill of Cipro in case he gets a another urine infection. He knows what a urine infection feels like.  The patient and his wife are planning to move to [facility] in the next couple of months after he has his TURP. They want to be closer to their daughter who lives in [facility].  I will missed both of them as patients.    I reviewed the following    Past Medical History   Diagnosis Date   • Hyperlipidemia         Past Surgical History   Procedure Laterality Date   • Femur orif  1964     right side   • Finger amputation  1964     RSF tip   • Turp-vapor  2002     BPH done in [place]       Allergies   Allergen Reactions   • Terbinafine      Loss of taste   • Trazodone      Excess sedation       Current Outpatient Prescriptions   Medication Sig Dispense Refill   • ciprofloxacin (CIPRO) 500 MG Tab Take 1 Tab by mouth 2 times a day for 10 days. 20 Tab 1   • tamsulosin (FLOMAX) 0.4 MG capsule TAKE 1 CAPSULE BY MOUTH EVERY NIGHT AT BEDTIME 90 Cap 3   • amlodipine (NORVASC) 5 MG Tab Take 1 Tab by mouth every day. 90 Tab 3   • lisinopril (PRINIVIL) 20 MG Tab Take 1 Tab by mouth 2 times a day. 180 Tab 3   • amitriptyline (ELAVIL) 25 MG Tab TAKE 1 TABLET BY MOUTH AT BEDTIME AS NEEDED FOR SLEEP 90 Tab 1   • simvastatin (ZOCOR) 40 MG Tab TAKE 1 TABLET BY MOUTH EVERY EVENING 90 Tab 3   • Omega-3 Fatty Acids (FISH OIL) 1000 MG  Cap capsule Take 1,000 mg by mouth 3 times a day, with meals.     • b complex vitamins tablet Take 1 Tab by mouth every day. 30 Tab 3   • Cholecalciferol (VITAMIN D3) 1000 UNIT TABS Take 2 Tabs by mouth every day. 100 Tab 3   • aspirin 81 MG tablet Take 81 mg by mouth every day.       No current facility-administered medications for this visit.        Family History   Problem Relation Age of Onset   • Cancer Brother      Prostate cancer   • Dementia Father    • Cancer Brother      prostate       Social History     Social History   • Marital Status:      Spouse Name: N/A   • Number of Children: N/A   • Years of Education: N/A     Occupational History   • Not on file.     Social History Main Topics   • Smoking status: Former Smoker -- 0.25 packs/day for 8 years     Types: Cigarettes     Quit date: 01/01/1965   • Smokeless tobacco: Never Used   • Alcohol Use: 0.5 oz/week     1 Standard drinks or equivalent per week      Comment: rarely   • Drug Use: No   • Sexual Activity:     Partners: Female     Birth Control/ Protection: Post-Menopausal     Other Topics Concern   • Not on file     Social History Narrative          Physical Exam   Constitutional: He is oriented. He appears well-developed and well-nourished. No distress.   HENT:   Head: Normocephalic and atraumatic.   Right Ear: External ear normal. Ear canal and TM normal   Left Ear: External ear normal. Ear canal and TM normal   Nose: Nose normal.   Mouth/Throat: Oropharynx is clear and moist.   Eyes: Conjunctivae and extraocular motions are normal. Pupils are equal, round, and reactive to light.        Fundi benign bilaterally   Neck: No thyromegaly present.   Cardiovascular: Normal rate, regular rhythm, normal heart sounds and intact distal pulses.  Exam reveals no gallop.    No murmur heard.  Pulmonary/Chest: Effort normal and breath sounds normal. No respiratory distress. He has no wheezes. He has no rales.   Abdominal: I don't palpate his bladder the way  I did previously. Soft. Bowel sounds are normal. No hepatosplenomegaly. He exhibits no distension. No tenderness. He has no rebound and no guarding.   Musculoskeletal: Normal range of motion. He exhibits no edema and no tenderness.   Lymphadenopathy:     He has no cervical adenopathy.  No supraclavicular adenopathy.   Neurological: He is alert and oriented. He has normal reflexes.        Babinskis downgoing bilaterally   Skin: Skin is warm and dry. No rash noted. No erythema.   Psychiatric: He has a normal mood and appropriate affect. His behavior is normal. Judgment and thought content normal.     1. Benign nodular prostatic hyperplasia with lower urinary tract symptoms  ciprofloxacin (CIPRO) 500 MG Tab--one by mouth twice a day for 10 days with one refill. The patient will have this on the shelf at home if he does develop a urine infection. I want him to proceed with his planned TURP with Dr. Tolliver, whom, I reassured the patient, will do an excellent job. I also told the patient that if Dr. Tolliver prefers that the patient have a general anesthetic that the patient should respect this and go with the way that Dr. Tolliver prefers. I reassured the patient that general anesthetics have become considerably safer over the last few years.     To have TURP 9/13/2017 with Dr Tolliver   2. Urinary retention due to benign prostatic hyperplasia   to proceed with TURP in September of this year.    3. Essential hypertension   controlled    4. 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 We would be glad to transfer records for them as needed.    Please note that this dictation was created using voice recognition software. I have worked with consultants from the vendor as well as technical experts from [facility] to optimize the interface. I have made every reasonable attempt to correct obvious errors, but I expect that there  are errors of grammar and possibly content that I did not discover before finalizing the note.

## 2017-08-09 NOTE — PATIENT INSTRUCTIONS
Patient given written instructions regarding  medications, referrals, dietary and lifestyle management, and return visit.    Kalyan Kramer MD

## 2017-08-09 NOTE — MR AVS SNAPSHOT
"        Mariano Mina   2017 10:00 AM   Office Visit   MRN: [id number]    Department:  [facility]   Dept Phone:  [phone]    Description:  Male : 1940   Provider:  Kalyan Kramer M.D.           Reason for Visit     Follow-Up           Allergies as of 2017     Allergen Noted Reactions    Terbinafine 2014       Loss of taste    Trazodone 2016       Excess sedation      You were diagnosed with     Benign nodular prostatic hyperplasia with lower urinary tract symptoms   [6912359]   To have TURP 2017 with Dr Tolliver    Urinary retention due to benign prostatic hyperplasia   [6023540]       Essential hypertension   [0721799]       Macrocytosis without anemia   [900076]         Vital Signs     Blood Pressure Pulse Temperature Respirations Height Weight    124/68 mmHg 85 35.1 °C (95.2 °F) 14 1.892 m (6' 2.5\") 80.287 kg (177 lb)    Body Mass Index Oxygen Saturation Smoking Status             22.43 kg/m2 97% Former Smoker         Basic Information     Date Of Birth Sex Race Ethnicity Preferred Language    1940 Male Other Non- English      Problem List              ICD-10-CM Priority Class Noted - Resolved    Mixed hyperlipidemia E78.2   2011 - Present    BPH (benign prostatic hyperplasia) N40.0   2011 - Present    Insomnia G47.00   2011 - Present    Macrocytosis without anemia D75.89   2014 - Present    Vitamin D deficiency E55.9   2014 - Present    Essential hypertension I10   5/3/2017 - Present      Health Maintenance        Date Due Completion Dates    IMM ZOSTER VACCINE 1999 ---    IMM PNEUMOCOCCAL 65+ (ADULT) LOW/MEDIUM RISK SERIES (1 of 2 - PCV13) 2004 ---    COLON CANCER SCREENING ANNUAL FIT 2019 (Originally 2015) 2014    IMM INFLUENZA (1) 2017 11/3/2011    IMM DTaP/Tdap/Td Vaccine (2 - Td) 2021            Current Immunizations     Influenza TIV (IM) 11/3/2011    MMR Vaccine " 11/20/2011    Tdap Vaccine 11/20/2011      Below and/or attached are the medications your provider expects you to take. Review all of your home medications and newly ordered medications with your provider and/or pharmacist. Follow medication instructions as directed by your provider and/or pharmacist. Please keep your medication list with you and share with your provider. Update the information when medications are discontinued, doses are changed, or new medications (including over-the-counter products) are added; and carry medication information at all times in the event of emergency situations     Allergies:  TERBINAFINE - (reactions not documented)     TRAZODONE - (reactions not documented)               Medications  Valid as of: August 09, 2017 - 12:17 PM    Generic Name Brand Name Tablet Size Instructions for use    Amitriptyline HCl (Tab) ELAVIL 25 MG TAKE 1 TABLET BY MOUTH AT BEDTIME AS NEEDED FOR SLEEP        AmLODIPine Besylate (Tab) NORVASC 5 MG Take 1 Tab by mouth every day.        Aspirin (Tab) aspirin 81 MG Take 81 mg by mouth every day.        B Complex Vitamins (Tab) b complex vitamins  Take 1 Tab by mouth every day.        Cholecalciferol (Tab) vitamin D3 (cholecalciferol) 1000 UNIT Take 2 Tabs by mouth every day.        Ciprofloxacin HCl (Tab) CIPRO 500 MG Take 1 Tab by mouth 2 times a day for 10 days.        Lisinopril (Tab) PRINIVIL 20 MG Take 1 Tab by mouth 2 times a day.        Omega-3 Fatty Acids (Cap) fish oil 1000 MG Take 1,000 mg by mouth 3 times a day, with meals.        Simvastatin (Tab) ZOCOR 40 MG TAKE 1 TABLET BY MOUTH EVERY EVENING        Tamsulosin HCl (Cap) FLOMAX 0.4 MG TAKE 1 CAPSULE BY MOUTH EVERY NIGHT AT BEDTIME        .                 Medicines prescribed today were sent to:     [id number] - [place], [place] - [address] AT [facility] [address]    [address] [place] [address]    Phone: [phone] Fax: [phone]    Open 24 Hours?: No         Medication refill instructions:       If your prescription bottle indicates you have medication refills left, it is not necessary to call your provider’s office. Please contact your pharmacy and they will refill your medication.    If your prescription bottle indicates you do not have any refills left, you may request refills at any time through one of the following ways: The online [employer] system (except Urgent Care), by calling your provider’s office, or by asking your pharmacy to contact your provider’s office with a refill request. Medication refills are processed only during regular business hours and may not be available until the next business day. Your provider may request additional information or to have a follow-up visit with you prior to refilling your medication.   *Please Note: Medication refills are assigned a new Rx number when refilled electronically. Your pharmacy may indicate that no refills were authorized even though a new prescription for the same medication is available at the pharmacy. Please request the medicine by name with the pharmacy before contacting your provider for a refill.           [employer] Status: Patient Declined

## 2017-09-06 ENCOUNTER — HOSPITAL ENCOUNTER (OUTPATIENT)
Dept: HOSPITAL 8 - STAR | Age: 77
Discharge: HOME | End: 2017-09-06
Attending: UROLOGY
Payer: MEDICARE

## 2017-09-06 DIAGNOSIS — R94.31: ICD-10-CM

## 2017-09-06 DIAGNOSIS — Z01.818: Primary | ICD-10-CM

## 2017-09-06 DIAGNOSIS — R79.1: ICD-10-CM

## 2017-09-06 DIAGNOSIS — R33.9: ICD-10-CM

## 2017-09-06 LAB
AST SERPL-CCNC: 18 U/L (ref 15–37)
BUN SERPL-MCNC: 17 MG/DL (ref 7–18)
HCT VFR BLD CALC: 40.6 % (ref 39.2–51.8)
HGB BLD-MCNC: 13.9 G/DL (ref 13.7–18)
WBC # BLD AUTO: 5.4 X10^3/UL (ref 3.4–10)

## 2017-09-06 PROCEDURE — 85610 PROTHROMBIN TIME: CPT

## 2017-09-06 PROCEDURE — 87086 URINE CULTURE/COLONY COUNT: CPT

## 2017-09-06 PROCEDURE — 36415 COLL VENOUS BLD VENIPUNCTURE: CPT

## 2017-09-06 PROCEDURE — 85730 THROMBOPLASTIN TIME PARTIAL: CPT

## 2017-09-06 PROCEDURE — 80053 COMPREHEN METABOLIC PANEL: CPT

## 2017-09-06 PROCEDURE — 85025 COMPLETE CBC W/AUTO DIFF WBC: CPT

## 2017-09-06 PROCEDURE — 81003 URINALYSIS AUTO W/O SCOPE: CPT

## 2017-09-06 PROCEDURE — 93005 ELECTROCARDIOGRAM TRACING: CPT

## 2017-09-13 ENCOUNTER — HOSPITAL ENCOUNTER (OUTPATIENT)
Dept: HOSPITAL 8 - OUT | Age: 77
Setting detail: OBSERVATION
LOS: 1 days | Discharge: HOME | End: 2017-09-14
Attending: UROLOGY | Admitting: UROLOGY
Payer: MEDICARE

## 2017-09-13 VITALS — WEIGHT: 195.33 LBS | HEIGHT: 74.5 IN | BODY MASS INDEX: 24.8 KG/M2

## 2017-09-13 VITALS — SYSTOLIC BLOOD PRESSURE: 133 MMHG | DIASTOLIC BLOOD PRESSURE: 79 MMHG

## 2017-09-13 VITALS — DIASTOLIC BLOOD PRESSURE: 75 MMHG | SYSTOLIC BLOOD PRESSURE: 127 MMHG

## 2017-09-13 DIAGNOSIS — Z87.891: ICD-10-CM

## 2017-09-13 DIAGNOSIS — R33.9: ICD-10-CM

## 2017-09-13 DIAGNOSIS — N40.1: Primary | ICD-10-CM

## 2017-09-13 LAB — BUN SERPL-MCNC: 17 MG/DL (ref 7–18)

## 2017-09-13 PROCEDURE — 82040 ASSAY OF SERUM ALBUMIN: CPT

## 2017-09-13 PROCEDURE — 80048 BASIC METABOLIC PNL TOTAL CA: CPT

## 2017-09-13 PROCEDURE — 96375 TX/PRO/DX INJ NEW DRUG ADDON: CPT

## 2017-09-13 PROCEDURE — 36415 COLL VENOUS BLD VENIPUNCTURE: CPT

## 2017-09-13 PROCEDURE — 88305 TISSUE EXAM BY PATHOLOGIST: CPT

## 2017-09-13 PROCEDURE — 85018 HEMOGLOBIN: CPT

## 2017-09-13 PROCEDURE — G0378 HOSPITAL OBSERVATION PER HR: HCPCS

## 2017-09-13 PROCEDURE — 96365 THER/PROPH/DIAG IV INF INIT: CPT

## 2017-09-13 PROCEDURE — 52630 REMOVE PROSTATE REGROWTH: CPT

## 2017-09-13 RX ADMIN — CEFAZOLIN SODIUM SCH MLS/HR: 1 SOLUTION INTRAVENOUS at 23:23

## 2017-09-13 RX ADMIN — INSULIN LISPRO SCH NOTE: 100 INJECTION, SOLUTION INTRAVENOUS; SUBCUTANEOUS at 17:00

## 2017-09-13 RX ADMIN — SODIUM CHLORIDE, SODIUM LACTATE, POTASSIUM CHLORIDE, CALCIUM CHLORIDE, AND DEXTROSE MONOHYDRATE SCH MLS/HR: 600; 310; 30; 20; 5 INJECTION, SOLUTION INTRAVENOUS at 23:25

## 2017-09-14 VITALS — SYSTOLIC BLOOD PRESSURE: 127 MMHG | DIASTOLIC BLOOD PRESSURE: 70 MMHG

## 2017-09-14 VITALS — SYSTOLIC BLOOD PRESSURE: 153 MMHG | DIASTOLIC BLOOD PRESSURE: 85 MMHG

## 2017-09-14 VITALS — DIASTOLIC BLOOD PRESSURE: 77 MMHG | SYSTOLIC BLOOD PRESSURE: 135 MMHG

## 2017-09-14 VITALS — DIASTOLIC BLOOD PRESSURE: 69 MMHG | SYSTOLIC BLOOD PRESSURE: 120 MMHG

## 2017-09-14 RX ADMIN — SODIUM CHLORIDE, SODIUM LACTATE, POTASSIUM CHLORIDE, CALCIUM CHLORIDE, AND DEXTROSE MONOHYDRATE SCH MLS/HR: 600; 310; 30; 20; 5 INJECTION, SOLUTION INTRAVENOUS at 11:06

## 2017-09-14 RX ADMIN — CEFAZOLIN SODIUM SCH MLS/HR: 1 SOLUTION INTRAVENOUS at 06:13

## 2017-09-14 RX ADMIN — INSULIN LISPRO SCH NOTE: 100 INJECTION, SOLUTION INTRAVENOUS; SUBCUTANEOUS at 01:00

## 2017-10-03 RX ORDER — AMITRIPTYLINE HYDROCHLORIDE 25 MG/1
TABLET, FILM COATED ORAL
Qty: 90 TAB | Refills: 1 | Status: SHIPPED | OUTPATIENT
Start: 2017-10-03 | End: 2018-04-10 | Stop reason: SDUPTHER

## 2018-04-10 RX ORDER — AMITRIPTYLINE HYDROCHLORIDE 25 MG/1
TABLET, FILM COATED ORAL
Qty: 90 TAB | Refills: 1 | Status: SHIPPED | OUTPATIENT
Start: 2018-04-10 | End: 2018-10-19 | Stop reason: SDUPTHER

## 2018-04-10 RX ORDER — SIMVASTATIN 40 MG
TABLET ORAL
Qty: 90 TAB | Refills: 1 | Status: SHIPPED | OUTPATIENT
Start: 2018-04-10

## 2018-10-10 RX ORDER — SIMVASTATIN 40 MG
TABLET ORAL
Qty: 90 TAB | Refills: 0 | OUTPATIENT
Start: 2018-10-10

## 2018-10-10 NOTE — TELEPHONE ENCOUNTER
Was the patient seen in the last year in this department? no    Does patient have an active prescription for medications requested? No     Received Request Via: Pharmacy

## 2019-01-17 RX ORDER — AMITRIPTYLINE HYDROCHLORIDE 25 MG/1
TABLET, FILM COATED ORAL
Qty: 90 TAB | Refills: 0 | OUTPATIENT
Start: 2019-01-17

## 2019-01-17 NOTE — TELEPHONE ENCOUNTER
Was the patient seen in the last year in this department? No  LAST SEEN 8/9/2017    Does patient have an active prescription for medications requested? No     Received Request Via: Pharmacy

## 2019-01-22 RX ORDER — AMITRIPTYLINE HYDROCHLORIDE 25 MG/1
TABLET, FILM COATED ORAL
Qty: 90 TAB | Refills: 0 | Status: SHIPPED | OUTPATIENT
Start: 2019-01-22

## 2021-01-14 DIAGNOSIS — Z23 NEED FOR VACCINATION: ICD-10-CM
